# Patient Record
Sex: FEMALE | Race: BLACK OR AFRICAN AMERICAN | URBAN - METROPOLITAN AREA
[De-identification: names, ages, dates, MRNs, and addresses within clinical notes are randomized per-mention and may not be internally consistent; named-entity substitution may affect disease eponyms.]

---

## 2017-01-19 ENCOUNTER — NEW PATIENT (OUTPATIENT)
Dept: URBAN - METROPOLITAN AREA CLINIC 30 | Facility: CLINIC | Age: 82
End: 2017-01-19
Payer: MEDICARE

## 2017-01-19 DIAGNOSIS — H40.1134 PRIMARY OPEN-ANGLE GLAUCOMA, BILATERAL, INDETERMINATE STAGE: ICD-10-CM

## 2017-01-19 DIAGNOSIS — H43.393 OTHER VITREOUS OPACITIES, BILATERAL: ICD-10-CM

## 2017-01-19 DIAGNOSIS — H25.813 COMBINED FORMS OF AGE-RELATED CATARACT, BILATERAL: Primary | ICD-10-CM

## 2017-01-19 PROCEDURE — 92004 COMPRE OPH EXAM NEW PT 1/>: CPT | Performed by: OPTOMETRIST

## 2017-01-19 PROCEDURE — 92134 CPTRZ OPH DX IMG PST SGM RTA: CPT | Performed by: OPTOMETRIST

## 2017-01-19 ASSESSMENT — KERATOMETRY
OD: 42.52
OS: 42.52

## 2017-01-19 ASSESSMENT — VISUAL ACUITY
OS: 20/40
OD: 20/50

## 2017-01-19 ASSESSMENT — INTRAOCULAR PRESSURE
OS: 15
OD: 13

## 2017-02-23 ENCOUNTER — Encounter (OUTPATIENT)
Dept: URBAN - METROPOLITAN AREA CLINIC 24 | Facility: CLINIC | Age: 82
End: 2017-02-23
Payer: MEDICARE

## 2017-02-23 DIAGNOSIS — Z01.818 ENCOUNTER FOR OTHER PREPROCEDURAL EXAMINATION: Primary | ICD-10-CM

## 2017-02-23 PROCEDURE — 99213 OFFICE O/P EST LOW 20 MIN: CPT | Performed by: PHYSICIAN ASSISTANT

## 2017-02-23 RX ORDER — MULTIVITAMIN WITH IRON
TABLET ORAL
Qty: 0 | Refills: 0 | Status: ACTIVE
Start: 2017-02-23

## 2017-02-23 RX ORDER — FAMOTIDINE 10 MG/1
10 MG TABLET, FILM COATED ORAL
Qty: 0 | Refills: 0 | Status: ACTIVE
Start: 2017-02-23

## 2017-03-01 ENCOUNTER — FOLLOW UP ESTABLISHED (OUTPATIENT)
Dept: URBAN - METROPOLITAN AREA CLINIC 24 | Facility: CLINIC | Age: 82
End: 2017-03-01
Payer: MEDICARE

## 2017-03-01 PROCEDURE — 92020 GONIOSCOPY: CPT | Performed by: OPHTHALMOLOGY

## 2017-03-01 PROCEDURE — 92083 EXTENDED VISUAL FIELD XM: CPT | Performed by: OPHTHALMOLOGY

## 2017-03-01 PROCEDURE — 92012 INTRM OPH EXAM EST PATIENT: CPT | Performed by: OPHTHALMOLOGY

## 2017-03-01 RX ORDER — PREDNISOLONE ACETATE 10 MG/ML
1 % SUSPENSION/ DROPS OPHTHALMIC
Qty: 1 | Refills: 2 | Status: INACTIVE
Start: 2017-03-01 | End: 2021-02-02

## 2017-03-01 RX ORDER — DICLOFENAC SODIUM 1 MG/ML
0.1 % SOLUTION/ DROPS OPHTHALMIC
Qty: 1 | Refills: 2 | Status: INACTIVE
Start: 2017-03-01 | End: 2021-02-02

## 2017-03-01 RX ORDER — OFLOXACIN 3 MG/ML
0.3 % SOLUTION/ DROPS OPHTHALMIC
Qty: 1 | Refills: 2 | Status: INACTIVE
Start: 2017-03-01 | End: 2021-02-02

## 2017-03-01 ASSESSMENT — INTRAOCULAR PRESSURE
OD: 17
OS: 19

## 2017-03-08 ENCOUNTER — SURGERY (OUTPATIENT)
Dept: URBAN - METROPOLITAN AREA SURGERY 12 | Facility: SURGERY | Age: 82
End: 2017-03-08
Payer: MEDICARE

## 2017-03-08 PROCEDURE — 0191T INSERTION OF ANTERIOR SEGMENT AQUEOUS DRAINAGE DEVICE, W/OUT EXTRAOCULAR RESERVO: CPT | Performed by: OPHTHALMOLOGY

## 2017-03-09 ENCOUNTER — POST OP (OUTPATIENT)
Dept: URBAN - METROPOLITAN AREA CLINIC 24 | Facility: CLINIC | Age: 82
End: 2017-03-09

## 2017-03-09 PROCEDURE — 99024 POSTOP FOLLOW-UP VISIT: CPT | Performed by: OPTOMETRIST

## 2017-03-09 ASSESSMENT — INTRAOCULAR PRESSURE: OD: 13

## 2017-03-15 ENCOUNTER — POST OP (OUTPATIENT)
Dept: URBAN - METROPOLITAN AREA CLINIC 24 | Facility: CLINIC | Age: 82
End: 2017-03-15

## 2017-03-15 DIAGNOSIS — Z96.1 PRESENCE OF INTRAOCULAR LENS: Primary | ICD-10-CM

## 2017-03-15 PROCEDURE — 99024 POSTOP FOLLOW-UP VISIT: CPT | Performed by: OPTOMETRIST

## 2017-03-15 ASSESSMENT — VISUAL ACUITY
OS: 20/40
OD: 20/20

## 2017-03-15 ASSESSMENT — INTRAOCULAR PRESSURE
OD: 10
OS: 11

## 2017-03-22 ENCOUNTER — SURGERY (OUTPATIENT)
Dept: URBAN - METROPOLITAN AREA SURGERY 12 | Facility: SURGERY | Age: 82
End: 2017-03-22
Payer: MEDICARE

## 2017-03-22 PROCEDURE — 0191T INSERTION OF ANTERIOR SEGMENT AQUEOUS DRAINAGE DEVICE, W/OUT EXTRAOCULAR RESERVO: CPT | Performed by: OPHTHALMOLOGY

## 2017-03-23 ENCOUNTER — POST OP (OUTPATIENT)
Dept: URBAN - METROPOLITAN AREA CLINIC 24 | Facility: CLINIC | Age: 82
End: 2017-03-23

## 2017-03-23 PROCEDURE — 99024 POSTOP FOLLOW-UP VISIT: CPT | Performed by: OPTOMETRIST

## 2017-03-23 ASSESSMENT — INTRAOCULAR PRESSURE: OS: 13

## 2021-02-02 ENCOUNTER — NEW PATIENT (OUTPATIENT)
Dept: URBAN - METROPOLITAN AREA CLINIC 24 | Facility: CLINIC | Age: 86
End: 2021-02-02
Payer: MEDICARE

## 2021-02-02 DIAGNOSIS — H02.835 DERMATOCHALASIS OF LEFT LOWER LID: ICD-10-CM

## 2021-02-02 DIAGNOSIS — H02.832 DERMATOCHALASIS OF RIGHT LOWER LID: ICD-10-CM

## 2021-02-02 PROCEDURE — 92133 CPTRZD OPH DX IMG PST SGM ON: CPT | Performed by: OPTOMETRIST

## 2021-02-02 PROCEDURE — 99204 OFFICE O/P NEW MOD 45 MIN: CPT | Performed by: OPTOMETRIST

## 2021-02-02 PROCEDURE — 92083 EXTENDED VISUAL FIELD XM: CPT | Performed by: OPTOMETRIST

## 2021-02-02 ASSESSMENT — INTRAOCULAR PRESSURE
OS: 16
OD: 14

## 2021-02-02 ASSESSMENT — VISUAL ACUITY
OS: 20/20
OD: 20/20

## 2021-08-02 ENCOUNTER — OFFICE VISIT (OUTPATIENT)
Dept: URBAN - METROPOLITAN AREA CLINIC 24 | Facility: CLINIC | Age: 86
End: 2021-08-02
Payer: MEDICARE

## 2021-08-02 DIAGNOSIS — H40.1132 PRIMARY OPEN-ANGLE GLAUCOMA, MODERATE STAGE, BILATERAL: Primary | ICD-10-CM

## 2021-08-02 PROCEDURE — 92083 EXTENDED VISUAL FIELD XM: CPT | Performed by: OPTOMETRIST

## 2021-08-02 PROCEDURE — 76514 ECHO EXAM OF EYE THICKNESS: CPT | Performed by: OPTOMETRIST

## 2021-08-02 PROCEDURE — 92250 FUNDUS PHOTOGRAPHY W/I&R: CPT | Performed by: OPTOMETRIST

## 2021-08-02 PROCEDURE — 99213 OFFICE O/P EST LOW 20 MIN: CPT | Performed by: OPTOMETRIST

## 2021-08-02 ASSESSMENT — INTRAOCULAR PRESSURE
OD: 14
OS: 17

## 2021-08-02 NOTE — IMPRESSION/PLAN
Impression: Primary open-angle glaucoma, moderate stage, bilateral
- Previously ollowed by Dr. Becky Knott - Family Hx of Glaucoma /Sulfa Allergy/ Heart or Hx of Migraines. Pt reports some difficulty with breathing (avoid b blocker)
- tmax unknown; presented on topicals - target IOP TBD
- pachs: OD: 547 OS: 534 Plan: Updated HVF 24-2 today; overall stable (poor reliability), pachs, (avg), disc photos (stable  cupping) CONTINUE latanoprost qhs ou
Stressed importance of continued care / compliance w/ gtts

## 2022-02-03 ENCOUNTER — OFFICE VISIT (OUTPATIENT)
Dept: URBAN - METROPOLITAN AREA CLINIC 24 | Facility: CLINIC | Age: 87
End: 2022-02-03
Payer: MEDICARE

## 2022-02-03 DIAGNOSIS — R51.9 HEADACHE: ICD-10-CM

## 2022-02-03 PROCEDURE — 92133 CPTRZD OPH DX IMG PST SGM ON: CPT | Performed by: OPTOMETRIST

## 2022-02-03 PROCEDURE — 92083 EXTENDED VISUAL FIELD XM: CPT | Performed by: OPTOMETRIST

## 2022-02-03 PROCEDURE — 99214 OFFICE O/P EST MOD 30 MIN: CPT | Performed by: OPTOMETRIST

## 2022-02-03 ASSESSMENT — KERATOMETRY
OD: 43.20
OS: 42.91

## 2022-02-03 ASSESSMENT — INTRAOCULAR PRESSURE
OS: 12
OD: 8

## 2022-02-03 ASSESSMENT — VISUAL ACUITY
OD: 20/20
OS: 20/20

## 2022-02-03 NOTE — IMPRESSION/PLAN
Impression: Headache: R51.9. Plan: There is no clear ocular etiology to explain recent HAs. Pt reassured. Continue ongoing care w/ PCP as directed.

## 2022-02-03 NOTE — IMPRESSION/PLAN
Impression: Primary open-angle glaucoma, moderate stage, bilateral
- tmax unknown; presented on topicals - pachs: OD: 547 OS: 534
- gonio Open to SS, 2+ tm pigment 3/1/17
- Previously ollowed by Dr. Bailey Taylor - (-) Family Hx of Glaucoma /Sulfa Allergy/ Heart; + history of migraines - Pt reports some difficulty with breathing (avoid b blocker) -- target IOP low teens Plan: Updated HVF 24-2 today; overall stable (dense sup arc OS), rnfl oct (stable thinning) CONTINUE:
1. latanoprost qhs ou

Stressed importance of continued care / compliance w/ gtts

## 2022-08-04 ENCOUNTER — OFFICE VISIT (OUTPATIENT)
Dept: URBAN - METROPOLITAN AREA CLINIC 24 | Facility: CLINIC | Age: 87
End: 2022-08-04
Payer: MEDICARE

## 2022-08-04 DIAGNOSIS — H40.1132 PRIMARY OPEN-ANGLE GLAUCOMA, BILATERAL, MODERATE STAGE: Primary | ICD-10-CM

## 2022-08-04 PROCEDURE — 99213 OFFICE O/P EST LOW 20 MIN: CPT | Performed by: OPTOMETRIST

## 2022-08-04 PROCEDURE — 92250 FUNDUS PHOTOGRAPHY W/I&R: CPT | Performed by: OPTOMETRIST

## 2022-08-04 PROCEDURE — 92083 EXTENDED VISUAL FIELD XM: CPT | Performed by: OPTOMETRIST

## 2022-08-04 ASSESSMENT — INTRAOCULAR PRESSURE
OS: 16
OD: 12

## 2022-08-04 NOTE — IMPRESSION/PLAN
Impression: Primary open-angle glaucoma, moderate stage, bilateral
- tmax unknown; presented on topicals - pachs: OD: 547 OS: 534
- gonio Open to SS, 2+ tm pigment 3/1/17
- Previously ollowed by Dr. Amber Leslie - (-) Family Hx of Glaucoma /Sulfa Allergy/ Heart; + history of migraines - Pt reports some difficulty with breathing (avoid b blocker) -- target IOP low teens Plan: Updated HVF 24-2 today; overall stable (dense sup arc OS), disc photos (cupping ou) Todays IOP 12 OD, 16 OS Med tolerated, IOP reasonable. 

CONTINUE:
1. latanoprost qhs ou

Stressed importance of continued care / compliance w/ gtts

## 2023-02-10 ENCOUNTER — OFFICE VISIT (OUTPATIENT)
Dept: URBAN - METROPOLITAN AREA CLINIC 24 | Facility: CLINIC | Age: 88
End: 2023-02-10
Payer: MEDICARE

## 2023-02-10 DIAGNOSIS — H43.393 OTHER VITREOUS OPACITIES, BILATERAL: ICD-10-CM

## 2023-02-10 DIAGNOSIS — H40.1132 PRIMARY OPEN-ANGLE GLAUCOMA, MODERATE STAGE, BILATERAL: Primary | ICD-10-CM

## 2023-02-10 DIAGNOSIS — Z96.1 PRESENCE OF INTRAOCULAR LENS: ICD-10-CM

## 2023-02-10 PROCEDURE — 99214 OFFICE O/P EST MOD 30 MIN: CPT | Performed by: OPTOMETRIST

## 2023-02-10 PROCEDURE — 92083 EXTENDED VISUAL FIELD XM: CPT | Performed by: OPTOMETRIST

## 2023-02-10 PROCEDURE — 92133 CPTRZD OPH DX IMG PST SGM ON: CPT | Performed by: OPTOMETRIST

## 2023-02-10 ASSESSMENT — VISUAL ACUITY
OS: 20/25
OD: 20/20

## 2023-02-10 ASSESSMENT — KERATOMETRY
OD: 43.07
OS: 42.67

## 2023-02-10 ASSESSMENT — INTRAOCULAR PRESSURE
OD: 14
OS: 16

## 2023-02-10 NOTE — IMPRESSION/PLAN
Impression: Primary open-angle glaucoma, moderate stage, bilateral
- tmax unknown; presented on topicals - pachs: OD: 547 OS: 534
- gonio Open to SS, 2+ tm pigment 3/1/17
- Previously ollowed by Dr. Scott Duke - (-) Family Hx of Glaucoma /Sulfa Allergy/ Heart; + history of migraines - Pt reports some difficulty with breathing (avoid b blocker) -- target IOP low teens Plan: Updated HVF 24-2 today; stable (full OD, dense sup arc OS), rnfl oct (stable) Todays IOP 14/16 Continue: 1. latanoprost qhs ou

Stressed importance of continued care / compliance w/ gtts

## 2023-08-11 ENCOUNTER — OFFICE VISIT (OUTPATIENT)
Dept: URBAN - METROPOLITAN AREA CLINIC 24 | Facility: CLINIC | Age: 88
End: 2023-08-11
Payer: MEDICARE

## 2023-08-11 DIAGNOSIS — H40.1132 PRIMARY OPEN-ANGLE GLAUCOMA, MODERATE STAGE, BILATERAL: Primary | ICD-10-CM

## 2023-08-11 PROCEDURE — 92083 EXTENDED VISUAL FIELD XM: CPT | Performed by: OPTOMETRIST

## 2023-08-11 PROCEDURE — 99214 OFFICE O/P EST MOD 30 MIN: CPT | Performed by: OPTOMETRIST

## 2023-08-11 PROCEDURE — 92250 FUNDUS PHOTOGRAPHY W/I&R: CPT | Performed by: OPTOMETRIST

## 2023-08-11 RX ORDER — PREDNISOLONE ACETATE 10 MG/ML
1 % SUSPENSION/ DROPS OPHTHALMIC
Qty: 5 | Refills: 2 | Status: ACTIVE
Start: 2023-08-11

## 2023-08-11 RX ORDER — DEXAMETHASONE SODIUM PHOSPHATE 1 MG/ML
0.1 % SOLUTION/ DROPS OPHTHALMIC
Qty: 5 | Refills: 1 | Status: ACTIVE
Start: 2023-08-11

## 2023-08-11 RX ORDER — LATANOPROST 50 UG/ML
0.005 % SOLUTION OPHTHALMIC
Qty: 7.5 | Refills: 3 | Status: ACTIVE
Start: 2023-08-11

## 2023-08-11 ASSESSMENT — INTRAOCULAR PRESSURE
OS: 20
OD: 13

## 2023-11-03 ENCOUNTER — OFFICE VISIT (OUTPATIENT)
Dept: URBAN - METROPOLITAN AREA CLINIC 24 | Facility: CLINIC | Age: 88
End: 2023-11-03
Payer: MEDICARE

## 2023-11-03 DIAGNOSIS — H40.1132 PRIMARY OPEN-ANGLE GLAUCOMA, MODERATE STAGE, BILATERAL: Primary | ICD-10-CM

## 2023-11-03 PROCEDURE — 99214 OFFICE O/P EST MOD 30 MIN: CPT | Performed by: OPTOMETRIST

## 2023-11-03 RX ORDER — BRIMONIDINE TARTRATE 2 MG/ML
0.2 % SOLUTION/ DROPS OPHTHALMIC
Qty: 15 | Refills: 3 | Status: ACTIVE
Start: 2023-11-03

## 2023-11-03 ASSESSMENT — INTRAOCULAR PRESSURE
OD: 14
OS: 18
OD: 13
OS: 21